# Patient Record
Sex: FEMALE | Race: WHITE | URBAN - METROPOLITAN AREA
[De-identification: names, ages, dates, MRNs, and addresses within clinical notes are randomized per-mention and may not be internally consistent; named-entity substitution may affect disease eponyms.]

---

## 2021-09-17 ENCOUNTER — OFFICE VISIT (OUTPATIENT)
Dept: PODIATRY CLINIC | Facility: CLINIC | Age: 61
End: 2021-09-17
Payer: COMMERCIAL

## 2021-09-17 DIAGNOSIS — M20.41 HAMMER TOES OF BOTH FEET: ICD-10-CM

## 2021-09-17 DIAGNOSIS — L84 HELOMA MOLLE: Primary | ICD-10-CM

## 2021-09-17 DIAGNOSIS — M20.42 HAMMER TOES OF BOTH FEET: ICD-10-CM

## 2021-09-17 PROCEDURE — 99204 OFFICE O/P NEW MOD 45 MIN: CPT | Performed by: STUDENT IN AN ORGANIZED HEALTH CARE EDUCATION/TRAINING PROGRAM

## 2021-09-17 NOTE — PATIENT INSTRUCTIONS
-Use offloading pads to prevent rubbing between fourth and fifth toes. -Wear shoes with wide toe box to prevent rubbing.  -Follow-up as needed for routine callus debridement.

## 2021-09-17 NOTE — PROGRESS NOTES
[facility], [facility] Podiatry  Progress Note    Alison Magallanes is a 62year old female. Patient presents with:  New Patient: corns between 4th and 5th toes bilaterally. Pain when wearing shoes.         HPI:     Patient is a healthy 52-year-old female who presents to Heart Disease Father    • Hypertension Father    • Thyroid disease Mother    • Arthritis Mother       Social History    Socioeconomic History      Marital status:     Tobacco Use      Smoking status: Never Smoker    Substance and Sexual Activity wear between fourth and fifth toes to help prevent callus formation.   Patient can also use cotton ball if this is more comfortable.  -Discussed possible surgical intervention the future such as derotational arthroplasty to fifth digits to help prevent furt

## 2024-07-02 ENCOUNTER — OFFICE VISIT (OUTPATIENT)
Dept: RHEUMATOLOGY | Facility: CLINIC | Age: 64
End: 2024-07-02
Payer: COMMERCIAL

## 2024-07-02 VITALS
HEIGHT: 64 IN | DIASTOLIC BLOOD PRESSURE: 66 MMHG | WEIGHT: 134.19 LBS | BODY MASS INDEX: 22.91 KG/M2 | HEART RATE: 70 BPM | SYSTOLIC BLOOD PRESSURE: 102 MMHG | RESPIRATION RATE: 16 BRPM

## 2024-07-02 DIAGNOSIS — M25.551 RIGHT HIP PAIN: ICD-10-CM

## 2024-07-02 DIAGNOSIS — M25.561 CHRONIC PAIN OF RIGHT KNEE: ICD-10-CM

## 2024-07-02 DIAGNOSIS — G89.29 CHRONIC PAIN OF RIGHT KNEE: ICD-10-CM

## 2024-07-02 DIAGNOSIS — R76.8 POSITIVE ANA (ANTINUCLEAR ANTIBODY): Primary | ICD-10-CM

## 2024-07-02 PROCEDURE — 99244 OFF/OP CNSLTJ NEW/EST MOD 40: CPT | Performed by: INTERNAL MEDICINE

## 2024-07-02 PROCEDURE — 3008F BODY MASS INDEX DOCD: CPT | Performed by: INTERNAL MEDICINE

## 2024-07-02 PROCEDURE — 3074F SYST BP LT 130 MM HG: CPT | Performed by: INTERNAL MEDICINE

## 2024-07-02 PROCEDURE — 3078F DIAST BP <80 MM HG: CPT | Performed by: INTERNAL MEDICINE

## 2024-07-02 RX ORDER — LEVOTHYROXINE SODIUM 0.05 MG/1
50 TABLET ORAL
COMMUNITY
Start: 2020-07-02

## 2024-07-02 RX ORDER — ROSUVASTATIN CALCIUM 10 MG/1
10 TABLET, COATED ORAL DAILY
COMMUNITY
Start: 2024-06-04

## 2024-07-02 NOTE — PROGRESS NOTES
Celine Perez is a 64 year old female who presents for   Chief Complaint   Patient presents with    Consult     Rheumatoid factor positive   .   HPI:     I had the pleasure of seeing Celine Perez on 7/2/2024 for evaluation. She is a pleasant 64 year old with hx of positive RF  and polyarthralgia.   I had last seen her on   Feb. 20, 2015,  JAn. 22, 2014. I had last seen her on 9.5.2012. in the past she has a  positive RF 1:320 with polyarthralgia. .   She had labs on 1/22/2014 -, crp si 0.6mg/dL, spep is neg hep c is neg, ig G is 1049, ig M is 157,  marcial neg, anti ccp neg, RF is 276, anti SSA neg, anti SSB neg, anti mpo neg, anti PR3 neg,     .She iss overall doing okin the past - she would have intermittent joint pains. She was activiely playing tennis.   With falres, she gets right wrist and right knee pain. She would get it every 3-4 months. It lasts a few hours to few days. The knee is only a a few hourss.   She didn't take plaquenil. She went to gluten free diet for 4-6 monthss in spring. She didn't totally feel better so she didn't cont.   She had allergy testing for food.   She went to a [facility] doctor and tried alternative treatment - mainly avoidance of certain foods.   She has mild dry eyes and mouth.       She is here on   7/2/2024  MARCIAL 1:80 - cytoplasmic - RF is 11 , ccp 2,   She is a pleasant 64 year old - who has elevted labs again with dr. Vyas -   She was referred here again.   She's doing well overall but she has certain joint pains -   She used to play tennis and now doing pickle ball.     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   She did see ortho and got mri this December 2023 . This did get better. But it's not 100% - it's at 75% better.   It is her right side and soemtimes it tighens up on her and then she strethces and it and it can improve.   Is in [place] half of the year.   She does noticed a weakness in her right leg   She assumes it's  her right IT band.   She does fluid running in the water and doing that -   She has to be concentrating on her right knee more     No swelling in her joints - not in knees or hands. She's not feeling it's in so much pain - the pain can be 6-7/10 pain. It then alleviates itself and it doesn't happen for a long period of time. It lasts about 1 minute .   But if she does a lot that day - she will be going up and down the stairs and the end of the night - it aches - she needs to ice it.   It's not much     She found out she had recurrent styes from almond allergies   She is having ENT issues - and she had meniere's dieasea thought - and vertigo and stopped almond scones -    No RaynaudS    Dry eyes - with contacts - with drops -   Has tried different things for fatigue - thought to be thryoid related - it's stable       Wt Readings from Last 2 Encounters:   24 134 lb 3.2 oz (60.9 kg)   02/20/15 141 lb (64 kg)     Body mass index is 23.04 kg/m².      Current Outpatient Medications   Medication Sig Dispense Refill    levothyroxine (LEVOXYL) 50 MCG Oral Tab Take 1 tablet (50 mcg total) by mouth.      rosuvastatin 10 MG Oral Tab Take 1 tablet (10 mg total) by mouth daily.      cholecalciferol 25 MCG (1000 UT) Oral Cap Take 5 capsules (5,000 Units total) by mouth 3 (three) times a week.      Probiotic Oral Cap Take  by mouth daily.      Multiple Vitamin (MULTI-VITAMINS) Oral Tab Take  by mouth daily.      B Complex Vitamins (VITAMIN-B COMPLEX) Oral Tab Take  by mouth daily.      Ascorbic Acid (VITAMIN C OR) Take 350 mg by mouth daily.      TURMERIC OR Take 425 mg by mouth daily.        Past Medical History:    Abdominal hernia    repair    Bilateral bunions    surgery    Graves disease    Hypothyroidism    medication      Past Surgical History:   Procedure Laterality Date          x3    Other surgical history      eye lid surgery      Family History   Problem Relation Age of Onset    Diabetes Father     Heart  Disease Father     Hypertension Father     Thyroid disease Mother     Arthritis Mother       Social History:  Social History     Socioeconomic History    Marital status:    Tobacco Use    Smoking status: Never   Substance and Sexual Activity    Alcohol use: No   Other Topics Concern    Caffeine Concern Yes     Comment: tea, 1 cup/day     Social Determinants of Health      Received from [facility]    [facility]           REVIEW OF SYSTEMS:   Review Of Systems:  Fatigue  Constitutional:No fever, no change in weight or appetitie  Derm: No rashes, no oral ulcers, no alopecia, no photosensitivity, no psoriasis  HEENT: No dry eyes, no dry mouth, no Raynaud's, no nasal ulcers, no parotid swelling, no neck pain, no jaw pain, no temple pain  Eyes: No visual changes,   CVS: No chest pain, no heart disease  RS: No SOB, no Cough, No Pleurtic pain,   GI: No nausea, no vomiiting, no abominal pain, no hx of ulcer, no gastritis, no heartburn, no dysphagia, no BRBPR or melena  : no dysuria, no hx of miscarriages, no DVT Hx, no hx of OCP,   Neuro: No numbness or tingling, no headache, no hx of seizures,   Psych: no hx of anxiety or depression  ENDO: no hx of thyroid disease, no hx of DM  Joint/Muscluskeltal: see HPI,   All other ROS are negative.     EXAM:   /66   Pulse 70   Resp 16   Ht 5' 4\" (1.626 m)   Wt 134 lb 3.2 oz (60.9 kg)   BMI 23.04 kg/m²   HEENT: Clear oropharynx, no oral ulcers, EOM intact, clear sclera, PERRLA, pleasant, no acute distress, no CAD,   No rashes  CVS: RRR, no murmurs  RS: CTAB, no crackles, no rhonchi  ABD: Soft Non tender, no HSM felt, BS positive  Joint exam:   no neck tendnerness, good ROM,    left 2-5th pips mild swelling, no tender,   mild right and left 2nd pip swelling  with mild tendnereess, mild fullness and tendnerness of right 3rd and 4th pips,   right 2nd and 3rd mtps full in feet, no tendernss,    left 2nd mtp no fullness noted, , no tendernss,   no knee tendneress, good  ROM of both knees  Right IT band /buritis tender   EXTREMITIES: no cyanosis, clubbing or edema  NEURO: intact touch, 5/5 ue and le strength      Component      Latest Ref Rng 1/22/2014           4:10 PM   Glucose      65 - 99 mg/dL 108 (H)   Sodium      136 - 144 mmol/L 139   Potassium      3.3 - 5.1 mmol/L 3.9   Chloride      95 - 110 mmol/L 102   CARBON DIOXIDE (P)      22 - 32 mmol/L 30   BLOOD UREA NITROGEN (P)      8 - 20 mg/dL 16   CREATININE      0.50 - 1.50 mg/dL 0.86   BUN/CREA RATIO      10.0 - 20.0 18.6   ANION GAP      0 - 18 7   CALCIUM      8.5 - 10.5 mg/dL 9.8   CALCULATED OSMOLALITY      275 - 295 mOsm/kg 290   AST      15 - 41 U/L 27   ALT (SGPT)      14 - 54 U/L 19   ALK PHOSPHATASE (P)      32 - 100 U/L 73   TOTAL BILIRUBIN (P)      0.3 - 1.2 mg/dL 0.9   TOTAL PROTEIN      5.9 - 8.4 g/dL 7.5   Albumin      3.5 - 4.8 g/dL 4.5   GLOBULIN, TOTAL      2.5 - 3.7 g/dL 3.0   A/G RATIO      1.0 - 2.0 1.5   GFR/NON-      >60 > 60   GFR/      >60 > 60   WHITE BLOOD COUNT (L)      4.0 - 11.0 K/UL 6.9   RED BLOOD CELL COUNT      3.70 - 5.40 M/UL 4.75   Hemoglobin      12.0 - 16.0 G/DL 14.8   Hematocrit      35.0 - 48.0 % 42.8   MEAN CORPUSCULAR VOLUME      80.0 - 100.0 FL 90.2   MEAN CORPUSCULAR HEMOGLOBIN      27.0 - 32.0 PG 31.1   MEAN CORPUSCULAR HEMOGLOBIN CO      32.0 - 37.0 G/DL 34.4   RED CELL DISTRIBUTION WIDTH      11.0 - 15.0 % 13.1   PLATELET COUNT (L)      140 - 400 K/   MEAN PLATELET VOLUME      7.4 - 10.3 FL 8.4   Neutrophils %      40 - 76 % 61   Lymphocytes %      25 - 46 % 29   Monocytes %      3 - 16 % 7   Eosinophils %      0 - 7 % 2   Basophils %      0 - 2 % 1   NEUTROPHILS #      1.8 - 7.7 K/UL 4.2   LYMPHOCYTES #      1.0 - 4.0 K/UL 2.0   MONOCYTES #      0.0 - 1.0 K/UL 0.5   EOSINOPHILS #      0.0 - 0.7 K/UL 0.1   BASOPHILS #      0.0 - 0.2 K/UL 0.0   ALPHA-1-GLOBULINS      0.1 - 0.3 g/dL    ALPHA-2-GLOBULINS      0.6 - 1.0 g/dL    BETA GLOBULINS       0.7 - 1.3 g/dL    GAMMA GLOBULINS      0.5 - 1.7 g/dL    ALBUMIN/GLOBULIN RATIO      1.0 - 2.0    SPE INTERPRETATION          ANTI-SJOGREN'S A (S)      Negative Negative   ANTI-SJOGREN'S B (S)      Negative Negative   COMPLEMENT C3      88 - 201 mg/dL 121   COMPLEMENT C4      18 - 55 mg/dL 38   IMMUNOGLOBULIN IgG (S)      694 - 1618 mg/dL 1049   HEPATITIS B CORE IgM (S)      NonReactive Non-Reactive   PROTEINASE 3 ANTIBODY      () U <0.2   MYELOPEROXIDASE ANTIBODY      () U <0.2   MALCOLM SCREEN      Negative Negative   C-CITRULLINATED PEPTIDE IGG AB      0.0 - 6.9 U/ML 0.6     RHEUMATOID FACTOR      <14 IU/mL 276   HEPATITIS C VIRUS ANTIBODY      NonReactive Non-Reactive   C-REACTIVE PROTEIN (CRP)(S)      0.0 - 0.9 mg/dL 0.6   IMMUNOGLOBULIN IgM (S)      60 - 263 mg/dL 157     9/1/23 10:51 AM    Instrument WBC  x10'3/microL 5.94   WBC  4.5 - 11.0 x10'3/microL 5.9   Red Blood Cells  4.00 - 5.60 x10'6/microL 4.59   Hemoglobin  12.1 - 16.4 g/dL 14.0   Hematocrit  38.0 - 50.0 % 42.6   MCV  79.0 - 98.0 fL 92.8   MCH  26.0 - 34.0 pg 30.5   MCHC  30.6 - 37.0 g/dL 32.9   RDW  11.5 - 14.5 % 12.6   Platelet Count  150 - 400 x10'3/microL 221   Nucleated RBC  % 0.0     Spep neg.   10 mo ago Comments   Sed Rate  0 - 30 mm/hr 3     9/1/23 10:51 AM    Rheumatoid Factor, IgG  <=6 Unit 11 High        9/1/23 10:51 AM    CYCLIC CITRULLINATED PEPTIDE IGG/IGA - DATA CONVG  0 - 19 Unit 2     9/1/23 10:51 AM    MALCOLM HEp-2 IgG  <1:80 <1:80   MALCOLM Comment See Note     9/1/23 10:51 AM    C-Reactive Protein  <=1.0 mg/dL 0.1     9/1/23 10:51 AM    Cytoplasmic Pattern Titer 1:80 Abnormal    Cytoplasmic Pattern Speckled Abnormal        9/1/23 10:51 AM    25OH Vit D Level  30.0 - 100.0 ng/mL 73.6     9/1/23 10:51 AM    Sodium  133 - 144 mEq/L 141   Potassium  3.5 - 5.1 mmol/L 4.3   Comment: Please note new reference range effective 02/09/2021   Chloride  98 - 107 mEq/L 106   Carbon Dioxide  21 - 31 mEq/L 27   Anion Gap  6.2 - 14.7 mmol/L 8.0    Glucose  70 - 99 mg/dL 100 High    BUN  7 - 25 mg/dL 21   Creatinine  0.60 - 1.20 mg/dL 0.94   Calcium  8.6 - 10.4 mg/dL 10.1   Protein, Total  6.4 - 8.9 g/dL 7.3   Albumin  3.5 - 5.7 g/dL 4.8   Bilirubin, Total  0.3 - 1.0 mg/dL 0.6   Alkaline Phosphatase  34 - 104 Units/L 80   AST  13 - 39 Units/L 25   ALT  7 - 52 Units/L 20     ASSESSMENT AND PLAN:   Celine Perez is a 64 year old female who presents for   Chief Complaint   Patient presents with    Consult     Rheumatoid factor positive       1.  Positive rheumatoid factor  11 - with polyarthralgia yony in right knee - and right IT band -  in the past she had no clear evidence of an inflammatory arthritis, will monitor,   - - check labs  in 2 weeks   - Continue activity and exercise  for right hip bursitis and right IT band    - Return to clinic in 1 year or if pain gets worse.   2.  History of hypothyroidism - f/u pcp      She can take ibuprofen as needed.    She may have an early arthritis that hasn't declared itself yet.     Summary:  Check labs in 2 weeks   Return to clinic in 6-12 months   Cont IT band exercises       Nae Hagan MD  7/2/2024  3:55 PM     is applicable because the patient's medical record notes reflects the ongoing nature of the continuous longitudinal relationship of care, and the medical record indicates that there is ongoing treatment of a serious/complex medical condition.

## 2024-08-02 ENCOUNTER — TELEPHONE (OUTPATIENT)
Dept: RHEUMATOLOGY | Facility: CLINIC | Age: 64
End: 2024-08-02

## 2024-08-02 NOTE — TELEPHONE ENCOUNTER
Patient would like a call back from the nurse to discuss lab results. Patient completed labs at a different lab.

## 2024-08-03 ENCOUNTER — PATIENT MESSAGE (OUTPATIENT)
Dept: RHEUMATOLOGY | Facility: CLINIC | Age: 64
End: 2024-08-03

## 2024-08-03 DIAGNOSIS — R76.8 POSITIVE ANA (ANTINUCLEAR ANTIBODY): Primary | ICD-10-CM

## 2024-08-03 NOTE — TELEPHONE ENCOUNTER
I tried calling patient but went to voicemail.  Blood work showing a positive RF of 255 and a positive double-stranded DNA of 20.  She was seen by Dr. Hagan in July for some joint pain but looks like it was just for knee pain

## 2024-08-05 NOTE — TELEPHONE ENCOUNTER
From: Celine Perez  To: Nae Hagan  Sent: 8/3/2024 10:55 PM CDT  Subject: Rheumatoid factor     Did you receive the results of the tests that you ordered? I did see that my rheumatoid factor went up to 255. The sm/rnp was in the normal range. I saw the results on the [employer] for [employer]. You should hopefully have received these results too.     I met with Dr Vyas for my annual physical. So far all went well but she wanted me to follow up with you about my elevated rheumatoid factor. What are the next steps?

## 2024-08-05 NOTE — TELEPHONE ENCOUNTER
Please see message below and advise.      LOV: 7/2/24   No future appointments.  Labs:     High     Comment: Performed By: [facility]  [address] [place] [address]  : Lamont Vera MD, PhD  CLIA Number: [id number]   [facility]     Specimen Collected: 07/09/24  8:47 AM    Performed by: [facility] Last Resulted: 07/10/24  6:10 PM   Received From: [employer]  Result Received: 08/02/24  2:34 PM     Carmona/RNP (KENDAL) Ab, IgG  Order: [id number]  Component  Ref Range & Units 7/9/24  8:47 AM   SM/RNP Antibody  0 - 19 Units 3   Comment: INTERPRETIVE INFORMATION: Carmona/RNP (KENDAL) Antibody, IgG      19 Units or Less ............. Negative    20 to 39 Units ............... Weak Positive    40 to 80 Units ............... Moderate Positive    81 Units or greater .......... Strong Positive    Carmona/RNP antibodies are frequently seen in patients with mixed  connective tissue disease (MCTD) and are also associated with  other systemic autoimmune rheumatic diseases (SARDs) such as  systemic lupus erythematosus (SLE), systemic sclerosis, and  myositis. Antibodies targeting the Carmona/RNP antigenic complex  also recognize Carmona antigens, therefore, the Carmona antibody  response must be considered when interpreting these results.  Performed By: [facility]  [address] [place] [id number]  : Lamont Vera MD, PhD  CLIA Number: [id number]   [facility]     Specimen Collected: 07/09/24  8:47 AM    Performed by: [facility] Last Resulted: 07/10/24  6:22 PM   Received From: [facility]  Result Received: 08/02/24  2:34 PM       Summary:  Check labs in 2 weeks   Return to clinic in 6-12 months   Cont IT band carlos Hagan MD  7/2/2024  3:55 PM

## 2024-08-06 NOTE — TELEPHONE ENCOUNTER
Called patient back.  Rheumatoid factor high at 255 and positive double-stranded DNA.  She has no joint pain or swelling.  She does feel achy in her muscles.  Will get CK  Currently has no active RA symptoms  She will make a follow-up with Dr. Hagan

## 2024-08-27 ENCOUNTER — LAB ENCOUNTER (OUTPATIENT)
Dept: LAB | Facility: HOSPITAL | Age: 64
End: 2024-08-27
Attending: INTERNAL MEDICINE
Payer: COMMERCIAL

## 2024-08-27 DIAGNOSIS — R76.8 POSITIVE ANA (ANTINUCLEAR ANTIBODY): ICD-10-CM

## 2024-08-27 LAB — CK SERPL-CCNC: 316 U/L

## 2024-08-27 PROCEDURE — 82550 ASSAY OF CK (CPK): CPT

## 2024-08-27 PROCEDURE — 36415 COLL VENOUS BLD VENIPUNCTURE: CPT

## 2024-10-31 ENCOUNTER — PATIENT MESSAGE (OUTPATIENT)
Dept: RHEUMATOLOGY | Facility: CLINIC | Age: 64
End: 2024-10-31

## 2024-11-26 ENCOUNTER — OFFICE VISIT (OUTPATIENT)
Dept: RHEUMATOLOGY | Facility: CLINIC | Age: 64
End: 2024-11-26
Payer: COMMERCIAL

## 2024-11-26 ENCOUNTER — HOSPITAL ENCOUNTER (OUTPATIENT)
Dept: GENERAL RADIOLOGY | Facility: HOSPITAL | Age: 64
Discharge: HOME | End: 2024-11-26
Attending: INTERNAL MEDICINE
Payer: COMMERCIAL

## 2024-11-26 ENCOUNTER — LAB ENCOUNTER (OUTPATIENT)
Dept: LAB | Facility: HOSPITAL | Age: 64
End: 2024-11-26
Attending: INTERNAL MEDICINE
Payer: COMMERCIAL

## 2024-11-26 VITALS
RESPIRATION RATE: 16 BRPM | HEART RATE: 83 BPM | WEIGHT: 136.13 LBS | SYSTOLIC BLOOD PRESSURE: 107 MMHG | HEIGHT: 64 IN | DIASTOLIC BLOOD PRESSURE: 65 MMHG | BODY MASS INDEX: 23.24 KG/M2

## 2024-11-26 DIAGNOSIS — M79.10 MYALGIA: ICD-10-CM

## 2024-11-26 DIAGNOSIS — M79.672 PAIN IN BOTH FEET: ICD-10-CM

## 2024-11-26 DIAGNOSIS — M62.81 MUSCLE WEAKNESS: Primary | ICD-10-CM

## 2024-11-26 DIAGNOSIS — M79.671 PAIN IN BOTH FEET: ICD-10-CM

## 2024-11-26 DIAGNOSIS — R76.8 RHEUMATOID FACTOR POSITIVE: ICD-10-CM

## 2024-11-26 DIAGNOSIS — M62.81 MUSCLE WEAKNESS: ICD-10-CM

## 2024-11-26 DIAGNOSIS — R74.8 ELEVATED CK: ICD-10-CM

## 2024-11-26 LAB
ALBUMIN SERPL-MCNC: 4.9 G/DL (ref 3.2–4.8)
ALBUMIN/GLOB SERPL: 1.9 {RATIO} (ref 1–2)
ALP LIVER SERPL-CCNC: 105 U/L
ALT SERPL-CCNC: 18 U/L
ANION GAP SERPL CALC-SCNC: 10 MMOL/L (ref 0–18)
AST SERPL-CCNC: 29 U/L (ref ?–34)
BILIRUB SERPL-MCNC: 0.6 MG/DL (ref 0.2–1.1)
BUN BLD-MCNC: 20 MG/DL (ref 9–23)
BUN/CREAT SERPL: 20.2 (ref 10–20)
CALCIUM BLD-MCNC: 10.1 MG/DL (ref 8.7–10.4)
CHLORIDE SERPL-SCNC: 104 MMOL/L (ref 98–112)
CK SERPL-CCNC: 127 U/L
CO2 SERPL-SCNC: 28 MMOL/L (ref 21–32)
CREAT BLD-MCNC: 0.99 MG/DL
DEPRECATED RDW RBC AUTO: 42.3 FL (ref 35.1–46.3)
EGFRCR SERPLBLD CKD-EPI 2021: 64 ML/MIN/1.73M2 (ref 60–?)
ERYTHROCYTE [DISTWIDTH] IN BLOOD BY AUTOMATED COUNT: 12.8 % (ref 11–15)
ERYTHROCYTE [SEDIMENTATION RATE] IN BLOOD: 15 MM/HR
FASTING STATUS PATIENT QL REPORTED: NO
GLOBULIN PLAS-MCNC: 2.6 G/DL (ref 2–3.5)
GLUCOSE BLD-MCNC: 85 MG/DL (ref 70–99)
HCT VFR BLD AUTO: 40 %
HGB BLD-MCNC: 13.9 G/DL
MAGNESIUM SERPL-MCNC: 1.7 MG/DL (ref 1.6–2.6)
MCH RBC QN AUTO: 31.1 PG (ref 26–34)
MCHC RBC AUTO-ENTMCNC: 34.8 G/DL (ref 31–37)
MCV RBC AUTO: 89.5 FL
OSMOLALITY SERPL CALC.SUM OF ELEC: 296 MOSM/KG (ref 275–295)
PLATELET # BLD AUTO: 289 10(3)UL (ref 150–450)
POTASSIUM SERPL-SCNC: 3.7 MMOL/L (ref 3.5–5.1)
PROT SERPL-MCNC: 7.5 G/DL (ref 5.7–8.2)
RBC # BLD AUTO: 4.47 X10(6)UL
SODIUM SERPL-SCNC: 142 MMOL/L (ref 136–145)
URATE SERPL-MCNC: 5.5 MG/DL
WBC # BLD AUTO: 7.4 X10(3) UL (ref 4–11)

## 2024-11-26 PROCEDURE — 3008F BODY MASS INDEX DOCD: CPT | Performed by: INTERNAL MEDICINE

## 2024-11-26 PROCEDURE — 82085 ASSAY OF ALDOLASE: CPT

## 2024-11-26 PROCEDURE — 83735 ASSAY OF MAGNESIUM: CPT

## 2024-11-26 PROCEDURE — 3078F DIAST BP <80 MM HG: CPT | Performed by: INTERNAL MEDICINE

## 2024-11-26 PROCEDURE — 86225 DNA ANTIBODY NATIVE: CPT

## 2024-11-26 PROCEDURE — 86037 ANCA TITER EACH ANTIBODY: CPT

## 2024-11-26 PROCEDURE — 73620 X-RAY EXAM OF FOOT: CPT | Performed by: INTERNAL MEDICINE

## 2024-11-26 PROCEDURE — 83516 IMMUNOASSAY NONANTIBODY: CPT

## 2024-11-26 PROCEDURE — 84550 ASSAY OF BLOOD/URIC ACID: CPT

## 2024-11-26 PROCEDURE — 85652 RBC SED RATE AUTOMATED: CPT

## 2024-11-26 PROCEDURE — 85027 COMPLETE CBC AUTOMATED: CPT

## 2024-11-26 PROCEDURE — 80053 COMPREHEN METABOLIC PANEL: CPT

## 2024-11-26 PROCEDURE — 99214 OFFICE O/P EST MOD 30 MIN: CPT | Performed by: INTERNAL MEDICINE

## 2024-11-26 PROCEDURE — 36415 COLL VENOUS BLD VENIPUNCTURE: CPT

## 2024-11-26 PROCEDURE — 86038 ANTINUCLEAR ANTIBODIES: CPT

## 2024-11-26 PROCEDURE — 86235 NUCLEAR ANTIGEN ANTIBODY: CPT

## 2024-11-26 PROCEDURE — 82550 ASSAY OF CK (CPK): CPT

## 2024-11-26 PROCEDURE — 86200 CCP ANTIBODY: CPT

## 2024-11-26 PROCEDURE — 3074F SYST BP LT 130 MM HG: CPT | Performed by: INTERNAL MEDICINE

## 2024-11-26 NOTE — PROGRESS NOTES
Celine Perez is a 64 year old female who presents for   Chief Complaint   Patient presents with    Follow - Up     Positive MARCIAL (antinuclear antibody)    Lab Results    Foot Pain     B/L, more on Right, toes seem inflamed    .   HPI:     I had the pleasure of seeing Celine Perez on 7/2/2024 for evaluation. She is a pleasant 64 year old with hx of positive RF  and polyarthralgia.   I had last seen her on   Feb. 20, 2015,  JAn. 22, 2014. I had last seen her on 9.5.2012. in the past she has a  positive RF 1:320 with polyarthralgia. .   She had labs on 1/22/2014 -, crp si 0.6mg/dL, spep is neg hep c is neg, ig G is 1049, ig M is 157,  marcial neg, anti ccp neg, RF is 276, anti SSA neg, anti SSB neg, anti mpo neg, anti PR3 neg,     .She iss overall doing okin the past - she would have intermittent joint pains. She was activiely playing tennis.   With falres, she gets right wrist and right knee pain. She would get it every 3-4 months. It lasts a few hours to few days. The knee is only a a few hourss.   She didn't take plaquenil. She went to gluten free diet for 4-6 monthss in spring. She didn't totally feel better so she didn't cont.   She had allergy testing for food.   She went to a [facility] doctor and tried alternative treatment - mainly avoidance of certain foods.   She has mild dry eyes and mouth.       She is here on   7/2/2024  MARCIAL 1:80 - cytoplasmic - RF is 11 , ccp 2,   She is a pleasant 64 year old - who has elevted labs again with dr. Vyas -   She was referred here again.   She's doing well overall but she has certain joint pains -   She used to play tennis and now doing pickle ball.     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   She did see ortho and got mri this December 2023 . This did get better. But it's not 100% - it's at 75% better.   It is her right side and soemtimes it tighens up on her and then she strethces and it and it can improve.   Is in [place]  half of the year.   She does noticed a weakness in her right leg   She assumes it's her right IT band.   She does fluid running in the water and doing that -   She has to be concentrating on her right knee more     No swelling in her joints - not in knees or hands. She's not feeling it's in so much pain - the pain can be 6-7/10 pain. It then alleviates itself and it doesn't happen for a long period of time. It lasts about 1 minute .   But if she does a lot that day - she will be going up and down the stairs and the end of the night - it aches - she needs to ice it.   It's not much     She found out she had recurrent styes from almond allergies   She is having ENT issues - and she had meniere's dieasea thought - and vertigo and stopped almond scones -    No RaynaudS    Dry eyes - with contacts - with drops -   Has tried different things for fatigue - thought to be thryoid related - it's stable     11/26/2024  She has a positive rf of 255 -,   Ck is 316- she was off of statin prior to this - and now off statin b/c thought statin was causing.     No big change off the statin   Still has foot pain.   She has noticed muscular wise - she feels weaker -   Still play pickle ball   In [place], she was playing pickle ball a lot.   She gets cramps with playing in different spots but thought it was from dehyrating -   Would get cramps in her abdomen -   She did start taking magensium last spring in 2023 - helping her sleep and it's helping her muscle cramping -   Byt the dn of th day - her b/l 5th toes bother her and her left 1st toe can hurt - - it's replaced mtps bilat 1st toes     She is gradually feeling weaker - over the year - she can't get up from the garden anymore.   She thought it was the IT band.   Wt Readings from Last 2 Encounters:   11/26/24 136 lb 1.6 oz (61.7 kg)   07/02/24 134 lb 3.2 oz (60.9 kg)     Body mass index is 23.36 kg/m².      Current Outpatient Medications   Medication Sig Dispense Refill     levothyroxine (LEVOXYL) 50 MCG Oral Tab Take 1 tablet (50 mcg total) by mouth.      cholecalciferol 25 MCG (1000 UT) Oral Cap Take 5 capsules (5,000 Units total) by mouth 3 (three) times a week.      Probiotic Oral Cap Take  by mouth daily.      Multiple Vitamin (MULTI-VITAMINS) Oral Tab Take  by mouth daily.      B Complex Vitamins (VITAMIN-B COMPLEX) Oral Tab Take  by mouth daily.      Ascorbic Acid (VITAMIN C OR) Take 350 mg by mouth daily.      rosuvastatin 10 MG Oral Tab Take 1 tablet (10 mg total) by mouth daily. (Patient not taking: Reported on 2024)      TURMERIC OR Take 425 mg by mouth daily.        Past Medical History:    Abdominal hernia    repair    Bilateral bunions    surgery    Graves disease    Hypothyroidism    medication      Past Surgical History:   Procedure Laterality Date          x3    Other surgical history      eye lid surgery      Family History   Problem Relation Age of Onset    Diabetes Father     Heart Disease Father     Hypertension Father     Thyroid disease Mother     Arthritis Mother       Social History:  Social History     Socioeconomic History    Marital status:    Tobacco Use    Smoking status: Never   Substance and Sexual Activity    Alcohol use: No   Other Topics Concern    Caffeine Concern Yes     Comment: tea, 1 cup/day     Social Drivers of Health      Received from [facility]    [facility]           REVIEW OF SYSTEMS:   Review Of Systems:  Fatigue  Constitutional:No fever, no change in weight or appetitie  Derm: No rashes, no oral ulcers, no alopecia, no photosensitivity, no psoriasis  HEENT: No dry eyes, no dry mouth, no Raynaud's, no nasal ulcers, no parotid swelling, no neck pain, no jaw pain, no temple pain  Eyes: No visual changes,   CVS: No chest pain, no heart disease  RS: No SOB, no Cough, No Pleurtic pain,   GI: No nausea, no vomiiting, no abominal pain, no hx of ulcer, no gastritis, no heartburn, no dysphagia, no BRBPR or melena  : no  dysuria, no hx of miscarriages, no DVT Hx, no hx of OCP,   Neuro: No numbness or tingling, no headache, no hx of seizures,   Psych: no hx of anxiety or depression  ENDO: no hx of thyroid disease, no hx of DM  Joint/Muscluskeltal: see HPI,   All other ROS are negative.     EXAM:   /65   Pulse 83   Resp 16   Ht 5' 4\" (1.626 m)   Wt 136 lb 1.6 oz (61.7 kg)   BMI 23.36 kg/m²   HEENT: Clear oropharynx, no oral ulcers, EOM intact, clear sclera, PERRLA, pleasant, no acute distress, no CAD,   No rashes  CVS: RRR, no murmurs  RS: CTAB, no crackles, no rhonchi  ABD: Soft Non tender, no HSM felt, BS positive  Joint exam:   no neck tendnerness, good ROM,    left 2-5th pips mild swelling, no tender,   mild right and left 2nd pip swelling  with mild tendnereess, mild fullness and tendnerness of right 3rd and 4th pips,   right 2nd and 3rd mtps full in feet, no tendernss,    left 2nd mtp no fullness noted, , no tendernss,   no knee tendneress, good ROM of both knees  Right IT band /buritis tender   EXTREMITIES: no cyanosis, clubbing or edema  NEURO: intact touch, 5/5 ue and le strength      Component      Latest Ref Rng 1/22/2014           4:10 PM   Glucose      65 - 99 mg/dL 108 (H)   Sodium      136 - 144 mmol/L 139   Potassium      3.3 - 5.1 mmol/L 3.9   Chloride      95 - 110 mmol/L 102   CARBON DIOXIDE (P)      22 - 32 mmol/L 30   BLOOD UREA NITROGEN (P)      8 - 20 mg/dL 16   CREATININE      0.50 - 1.50 mg/dL 0.86   BUN/CREA RATIO      10.0 - 20.0 18.6   ANION GAP      0 - 18 7   CALCIUM      8.5 - 10.5 mg/dL 9.8   CALCULATED OSMOLALITY      275 - 295 mOsm/kg 290   AST      15 - 41 U/L 27   ALT (SGPT)      14 - 54 U/L 19   ALK PHOSPHATASE (P)      32 - 100 U/L 73   TOTAL BILIRUBIN (P)      0.3 - 1.2 mg/dL 0.9   TOTAL PROTEIN      5.9 - 8.4 g/dL 7.5   Albumin      3.5 - 4.8 g/dL 4.5   GLOBULIN, TOTAL      2.5 - 3.7 g/dL 3.0   A/G RATIO      1.0 - 2.0 1.5   GFR/NON-      >60 > 60   GFR/  AMERICAN      >60 > 60   WHITE BLOOD COUNT (L)      4.0 - 11.0 K/UL 6.9   RED BLOOD CELL COUNT      3.70 - 5.40 M/UL 4.75   Hemoglobin      12.0 - 16.0 G/DL 14.8   Hematocrit      35.0 - 48.0 % 42.8   MEAN CORPUSCULAR VOLUME      80.0 - 100.0 FL 90.2   MEAN CORPUSCULAR HEMOGLOBIN      27.0 - 32.0 PG 31.1   MEAN CORPUSCULAR HEMOGLOBIN CO      32.0 - 37.0 G/DL 34.4   RED CELL DISTRIBUTION WIDTH      11.0 - 15.0 % 13.1   PLATELET COUNT (L)      140 - 400 K/   MEAN PLATELET VOLUME      7.4 - 10.3 FL 8.4   Neutrophils %      40 - 76 % 61   Lymphocytes %      25 - 46 % 29   Monocytes %      3 - 16 % 7   Eosinophils %      0 - 7 % 2   Basophils %      0 - 2 % 1   NEUTROPHILS #      1.8 - 7.7 K/UL 4.2   LYMPHOCYTES #      1.0 - 4.0 K/UL 2.0   MONOCYTES #      0.0 - 1.0 K/UL 0.5   EOSINOPHILS #      0.0 - 0.7 K/UL 0.1   BASOPHILS #      0.0 - 0.2 K/UL 0.0   ALPHA-1-GLOBULINS      0.1 - 0.3 g/dL    ALPHA-2-GLOBULINS      0.6 - 1.0 g/dL    BETA GLOBULINS      0.7 - 1.3 g/dL    GAMMA GLOBULINS      0.5 - 1.7 g/dL    ALBUMIN/GLOBULIN RATIO      1.0 - 2.0    SPE INTERPRETATION          ANTI-SJOGREN'S A (S)      Negative Negative   ANTI-SJOGREN'S B (S)      Negative Negative   COMPLEMENT C3      88 - 201 mg/dL 121   COMPLEMENT C4      18 - 55 mg/dL 38   IMMUNOGLOBULIN IgG (S)      694 - 1618 mg/dL 1049   HEPATITIS B CORE IgM (S)      NonReactive Non-Reactive   PROTEINASE 3 ANTIBODY      () U <0.2   MYELOPEROXIDASE ANTIBODY      () U <0.2   MALCOLM SCREEN      Negative Negative   C-CITRULLINATED PEPTIDE IGG AB      0.0 - 6.9 U/ML 0.6     RHEUMATOID FACTOR      <14 IU/mL 276   HEPATITIS C VIRUS ANTIBODY      NonReactive Non-Reactive   C-REACTIVE PROTEIN (CRP)(S)      0.0 - 0.9 mg/dL 0.6   IMMUNOGLOBULIN IgM (S)      60 - 263 mg/dL 157     9/1/23 10:51 AM    Instrument WBC  x10'3/microL 5.94   WBC  4.5 - 11.0 x10'3/microL 5.9   Red Blood Cells  4.00 - 5.60 x10'6/microL 4.59   Hemoglobin  12.1 - 16.4 g/dL 14.0   Hematocrit  38.0 -  50.0 % 42.6   MCV  79.0 - 98.0 fL 92.8   MCH  26.0 - 34.0 pg 30.5   MCHC  30.6 - 37.0 g/dL 32.9   RDW  11.5 - 14.5 % 12.6   Platelet Count  150 - 400 x10'3/microL 221   Nucleated RBC  % 0.0     Spep neg.   10 mo ago Comments   Sed Rate  0 - 30 mm/hr 3     9/1/23 10:51 AM    Rheumatoid Factor, IgG  <=6 Unit 11 High        9/1/23 10:51 AM    CYCLIC CITRULLINATED PEPTIDE IGG/IGA - DATA CONVG  0 - 19 Unit 2     9/1/23 10:51 AM    MALCOLM HEp-2 IgG  <1:80 <1:80   MALCOLM Comment See Note     9/1/23 10:51 AM    C-Reactive Protein  <=1.0 mg/dL 0.1     9/1/23 10:51 AM    Cytoplasmic Pattern Titer 1:80 Abnormal    Cytoplasmic Pattern Speckled Abnormal        9/1/23 10:51 AM    25OH Vit D Level  30.0 - 100.0 ng/mL 73.6     9/1/23 10:51 AM    Sodium  133 - 144 mEq/L 141   Potassium  3.5 - 5.1 mmol/L 4.3   Comment: Please note new reference range effective 02/09/2021   Chloride  98 - 107 mEq/L 106   Carbon Dioxide  21 - 31 mEq/L 27   Anion Gap  6.2 - 14.7 mmol/L 8.0   Glucose  70 - 99 mg/dL 100 High    BUN  7 - 25 mg/dL 21   Creatinine  0.60 - 1.20 mg/dL 0.94   Calcium  8.6 - 10.4 mg/dL 10.1   Protein, Total  6.4 - 8.9 g/dL 7.3   Albumin  3.5 - 5.7 g/dL 4.8   Bilirubin, Total  0.3 - 1.0 mg/dL 0.6   Alkaline Phosphatase  34 - 104 Units/L 80   AST  13 - 39 Units/L 25   ALT  7 - 52 Units/L 20       7/9/24  8:47 AM    SM/RNP Antibody  0 - 19 Units 3   Comment: INTERPRETIVE INFORMATION: Carmona/RNP (KENDAL) Antibody, IgG      19 Units or Less ............. Negative    20 to 39 Units ............... Weak Positive    40 to 80 Units ............... Moderate Positive    81 Units or greater .......... Strong Positive    Carmona/RNP antibodies are frequently seen in patients with mixed  connective tissue disease (MCTD) and are also associated with  other systemic autoimmune rheumatic diseases (SARDs) such as  systemic lupus erythematosus (SLE), systemic sclerosis, and  myositis. Antibodies targeting the Carmona/RNP antigenic complex  also recognize Carmona  antigens, therefore, the Carmona antibody  response must be considered when interpreting these results.  Performed By: [facility]  [address] [place] [address]  : Lamont Vera MD, PhD  CLIA Number: [id number]   Resulting Agency [facility]     Specimen Collected: 07/09/24  8:47 AM       7/9/24  8:47 AM    Rheumatoid Factor  0 - 14 IU/mL 255 High              Ref Range & Units 8/27/24  1:31 PM   CK   U/L U/L 316 High      ASSESSMENT AND PLAN:   Celine Perez is a 64 year old female who presents for   Chief Complaint   Patient presents with    Follow - Up     Positive MALCOLM (antinuclear antibody)    Lab Results    Foot Pain     B/L, more on Right, toes seem inflamed        1.  Positive rheumatoid factor  11 - with polyarthralgia yony in right knee - and right IT band -  in the past she had no clear evidence of an inflammatory arthritis, will monitor,   Increasingp ain in her toes - hx of b/l foot sx - yony around 1st mtps - which are repalced and b/l 5th toes   - - check labs  in 2 weeks   - Continue activity and exercise  for right hip bursitis and right IT band    - Return to clinic in 1 year or if pain gets worse.   2.  History of hypothyroidism - f/u pcp   3. Elevated ck -with increasing muscle weakness over the year   - repeat ck , check muscle ezymes and myoistis panel   Can't get up from floor but can put gallon of milk away  -   Went off statin around 7/2024 - and stayed off - will repeat muscle enzymes at [facility]   - trying yoga - and getting slightly   4. Hx of pos ds dna - repeat tests - and connective tissue disease panel      She can take ibuprofen as needed.    She may have an early arthritis that hasn't declared itself yet.     Summary:  Check labs today   Check bilat feet xrays   Depending on results -   Return to clinic in 4months.   Cont IT band exercises       Nae Hagan MD  11/26/2024   2:30 PM    Labs from 11/26/2024- myositis panel is  pending - ck is normalized.   Component      Latest Ref Rng 8/27/2024 11/26/2024   Glucose      70 - 99 mg/dL  85    Sodium      136 - 145 mmol/L  142    Potassium      3.5 - 5.1 mmol/L  3.7    Chloride      98 - 112 mmol/L  104    Carbon Dioxide, Total      21.0 - 32.0 mmol/L  28.0    ANION GAP      0 - 18 mmol/L  10    BUN      9 - 23 mg/dL  20    CREATININE      0.55 - 1.02 mg/dL  0.99    BUN/CREATININE RATIO      10.0 - 20.0   20.2 (H)    CALCIUM      8.7 - 10.4 mg/dL  10.1    CALCULATED OSMOLALITY      275 - 295 mOsm/kg  296 (H)    EGFR      >=60 mL/min/1.73m2  64    ALT (SGPT)      10 - 49 U/L  18    AST (SGOT)      <34 U/L  29    ALKALINE PHOSPHATASE      50 - 130 U/L  105    Total Bilirubin      0.2 - 1.1 mg/dL  0.6    PROTEIN, TOTAL      5.7 - 8.2 g/dL  7.5    Albumin      3.2 - 4.8 g/dL  4.9 (H)    Globulin      2.0 - 3.5 g/dL  2.6    A/G Ratio      1.0 - 2.0   1.9    Patient Fasting for CMP?  No    WBC      4.0 - 11.0 x10(3) uL  7.4    RBC      3.80 - 5.30 x10(6)uL  4.47    Hemoglobin      12.0 - 16.0 g/dL  13.9    Hematocrit      35.0 - 48.0 %  40.0    MCV      80.0 - 100.0 fL  89.5    MCH      26.0 - 34.0 pg  31.1    MCHC      31.0 - 37.0 g/dL  34.8    RDW      11.0 - 15.0 %  12.8    RDW-SD      35.1 - 46.3 fL  42.3    Platelet Count      150.0 - 450.0 10(3)uL  289.0    MYELOPEROX ANTIBODIES, IGG      0.0 - 0.9 units  <0.2    SERINE PROTEASE3, IGG      0.0 - 0.9 units  <0.2    Cytoplasmic (C-ANCA)      Neg:<1:20 titer  <1:20    Perinuclear (P-ANCA)      Neg:<1:20 titer  <1:20    ATYPICAL PANCA      Neg:<1:20 titer  <1:20    Expanded KENDAL Antibody Screen, IGG      <0.7 ug/l  <0.09    Anti-dsDNA antibody      <10 IU/mL  <0.6    Connective Tissue Disease Screen Interpretation      Negative   Negative    Anti Double Strand DNA      <10   <10    Reviewed By:  Guanako Kessler M.D.    CK       U/L U/L 316 (H)  127    Aldolase      3.3 - 10.3 U/L  7.2    SED RATE      0 - 30 mm/Hr  15    URIC ACID      3.1  - 7.8 mg/dL  5.5    C-Citrullinated Peptide IgG AB      0.0 - 6.9 U/mL  <0.4    MALCOLM SCREEN      Negative   Negative    Magnesium, Serum      1.6 - 2.6 mg/dL  1.7       Nae Hagan MD  12/3/2024            is applicable because the patient's medical record notes reflects the ongoing nature of the continuous longitudinal relationship of care, and the medical record indicates that there is ongoing treatment of a serious/complex medical condition.

## 2024-11-26 NOTE — PATIENT INSTRUCTIONS
Check labs today   Check bilat feet xrays   Depending on results -   Return to clinic in 4months.   Cont IT band exercises

## 2024-11-27 LAB
CCP IGG SERPL-ACNC: <0.4 U/ML (ref 0–6.9)
DSDNA IGG SERPL IA-ACNC: <0.6 IU/ML
ENA AB SER QL IA: <0.09 UG/L
ENA AB SER QL IA: NEGATIVE

## 2024-11-29 LAB — ALDOLASE: 7.2 U/L

## 2024-12-02 LAB
ANTI-MPO ANTIBODIES: <0.2 UNITS
ANTI-PR3 ANTIBODIES: <0.2 UNITS
NUCLEAR IGG TITR SER IF: NEGATIVE {TITER}

## 2024-12-03 LAB — DSDNA AB TITR SER: <10 {TITER}

## 2024-12-21 LAB
ANTI-EJ: NEGATIVE
ANTI-JO-1: <20 UNITS
ANTI-KU: NEGATIVE
ANTI-MDA-5: <20 UNITS
ANTI-MI-2 AB: NEGATIVE
ANTI-NXP-2: <20 UNITS
ANTI-OJ: NEGATIVE
ANTI-PL-12: NEGATIVE
ANTI-PL-7: NEGATIVE
ANTI-PM/SCL100: <20 UNITS
ANTI-SAE1: <20 UNITS
ANTI-SRP AB: NEGATIVE
ANTI-SSA 52KD IGG: <20 UNITS
ANTI-TIF-1GAMMA: <20 UNITS
ANTI-U1 RNP: <20 UNITS
ANTI-U2 RNP: NEGATIVE
ANTI-U3 RNP: NEGATIVE